# Patient Record
Sex: MALE | Race: WHITE | NOT HISPANIC OR LATINO | Employment: OTHER | URBAN - METROPOLITAN AREA
[De-identification: names, ages, dates, MRNs, and addresses within clinical notes are randomized per-mention and may not be internally consistent; named-entity substitution may affect disease eponyms.]

---

## 2018-01-09 ENCOUNTER — OFFICE VISIT (OUTPATIENT)
Dept: INTERNAL MEDICINE | Facility: CLINIC | Age: 73
End: 2018-01-09
Payer: MEDICARE

## 2018-01-09 VITALS
WEIGHT: 199.75 LBS | HEIGHT: 69 IN | HEART RATE: 70 BPM | SYSTOLIC BLOOD PRESSURE: 124 MMHG | BODY MASS INDEX: 29.59 KG/M2 | DIASTOLIC BLOOD PRESSURE: 72 MMHG | TEMPERATURE: 97 F | OXYGEN SATURATION: 98 %

## 2018-01-09 DIAGNOSIS — M54.16 LUMBAR RADICULOPATHY: ICD-10-CM

## 2018-01-09 DIAGNOSIS — J01.40 ACUTE NON-RECURRENT PANSINUSITIS: ICD-10-CM

## 2018-01-09 DIAGNOSIS — Z95.820 S/P ANGIOPLASTY WITH STENT: ICD-10-CM

## 2018-01-09 DIAGNOSIS — H66.001 ACUTE SUPPURATIVE OTITIS MEDIA OF RIGHT EAR WITHOUT SPONTANEOUS RUPTURE OF TYMPANIC MEMBRANE, RECURRENCE NOT SPECIFIED: Primary | ICD-10-CM

## 2018-01-09 DIAGNOSIS — E78.00 PURE HYPERCHOLESTEROLEMIA: ICD-10-CM

## 2018-01-09 DIAGNOSIS — K58.0 IRRITABLE BOWEL SYNDROME WITH DIARRHEA: ICD-10-CM

## 2018-01-09 DIAGNOSIS — I10 ESSENTIAL HYPERTENSION: ICD-10-CM

## 2018-01-09 DIAGNOSIS — I25.10 CORONARY ARTERY DISEASE, ANGINA PRESENCE UNSPECIFIED, UNSPECIFIED VESSEL OR LESION TYPE, UNSPECIFIED WHETHER NATIVE OR TRANSPLANTED HEART: ICD-10-CM

## 2018-01-09 PROBLEM — K58.8 OTHER IRRITABLE BOWEL SYNDROME: Status: ACTIVE | Noted: 2018-01-09

## 2018-01-09 PROBLEM — G47.09 OTHER INSOMNIA: Status: ACTIVE | Noted: 2018-01-09

## 2018-01-09 PROBLEM — E78.49 OTHER HYPERLIPIDEMIA: Status: ACTIVE | Noted: 2018-01-09

## 2018-01-09 PROCEDURE — 1159F MED LIST DOCD IN RCRD: CPT | Mod: S$GLB,,, | Performed by: FAMILY MEDICINE

## 2018-01-09 PROCEDURE — 99999 PR PBB SHADOW E&M-EST. PATIENT-LVL III: CPT | Mod: PBBFAC,,, | Performed by: FAMILY MEDICINE

## 2018-01-09 PROCEDURE — 99205 OFFICE O/P NEW HI 60 MIN: CPT | Mod: S$GLB,,, | Performed by: FAMILY MEDICINE

## 2018-01-09 PROCEDURE — 1125F AMNT PAIN NOTED PAIN PRSNT: CPT | Mod: S$GLB,,, | Performed by: FAMILY MEDICINE

## 2018-01-09 PROCEDURE — 3008F BODY MASS INDEX DOCD: CPT | Mod: S$GLB,,, | Performed by: FAMILY MEDICINE

## 2018-01-09 RX ORDER — GUAIFENESIN AND DEXTROMETHORPHAN HYDROBROMIDE 1200; 60 MG/1; MG/1
1 TABLET, EXTENDED RELEASE ORAL 2 TIMES DAILY
Qty: 20 TABLET | Refills: 0 | Status: SHIPPED | OUTPATIENT
Start: 2018-01-09 | End: 2018-01-19

## 2018-01-09 RX ORDER — ASPIRIN 81 MG/1
81 TABLET ORAL DAILY
COMMUNITY

## 2018-01-09 RX ORDER — NEOMYCIN SULFATE, POLYMYXIN B SULFATE AND HYDROCORTISONE 10; 3.5; 1 MG/ML; MG/ML; [USP'U]/ML
3 SUSPENSION/ DROPS AURICULAR (OTIC) 4 TIMES DAILY
Qty: 10 ML | Refills: 0 | Status: SHIPPED | OUTPATIENT
Start: 2018-01-09 | End: 2018-01-19

## 2018-01-09 RX ORDER — HYDROXYZINE HYDROCHLORIDE 25 MG/1
25 TABLET, FILM COATED ORAL NIGHTLY
Refills: 0 | COMMUNITY
Start: 2017-12-14 | End: 2018-01-09

## 2018-01-09 RX ORDER — DICYCLOMINE HYDROCHLORIDE 20 MG/1
20 TABLET ORAL 2 TIMES DAILY
COMMUNITY

## 2018-01-09 RX ORDER — AMOXICILLIN AND CLAVULANATE POTASSIUM 875; 125 MG/1; MG/1
1 TABLET, FILM COATED ORAL 2 TIMES DAILY
Qty: 20 TABLET | Refills: 0 | Status: SHIPPED | OUTPATIENT
Start: 2018-01-09 | End: 2018-01-19

## 2018-01-09 RX ORDER — HYDRALAZINE HYDROCHLORIDE 50 MG/1
50 TABLET, FILM COATED ORAL 3 TIMES DAILY
Qty: 90 TABLET | Refills: 0
Start: 2018-01-09 | End: 2018-02-09

## 2018-01-09 RX ORDER — FLUTICASONE PROPIONATE 50 MCG
2 SPRAY, SUSPENSION (ML) NASAL DAILY
Qty: 1 BOTTLE | Refills: 0 | Status: SHIPPED | OUTPATIENT
Start: 2018-01-09

## 2018-01-09 NOTE — PROGRESS NOTES
Subjective:   Patient ID:  Stephen Wynn is a 73 y.o. male.    Chief Complaint:  Establish Care and Otalgia    Past Medical History:   Diagnosis Date    CAD (coronary artery disease) 1/9/2018    Essential hypertension 1/9/2018    High cholesterol     Irritable bowel syndrome with diarrhea 1/9/2018    Lumbar radiculopathy 1/9/2018    S/P angioplasty with stent 1/9/2018     Past Surgical History:   Procedure Laterality Date    CORONARY ANGIOPLASTY WITH STENT PLACEMENT      SAMPSON       Family History   Problem Relation Age of Onset    Cancer Mother     Cancer Father     Cancer Sister      Review of patient's allergies indicates:   Allergen Reactions    Hydrocodone Hallucinations       Current Outpatient Prescriptions:     amlodipine (NORVASC) 10 MG tablet, Take 10 mg by mouth once daily., Disp: , Rfl:     aspirin (ECOTRIN) 81 MG EC tablet, Take 81 mg by mouth once daily., Disp: , Rfl:     atorvastatin (LIPITOR) 40 MG tablet, Take 40 mg by mouth once daily., Disp: , Rfl:     dicyclomine (BENTYL) 20 mg tablet, Take 20 mg by mouth every 6 (six) hours., Disp: , Rfl:     metoprolol succinate (TOPROL-XL) 50 MG 24 hr tablet, Take 50 mg by mouth once daily., Disp: , Rfl:     polycarbophil (FIBERCON) 625 mg tablet, Take 625 mg by mouth once daily., Disp: , Rfl:     fluticasone (FLONASE) 50 mcg/actuation nasal spray, 2 sprays (100 mcg total) by Each Nare route once daily., Disp: 1 Bottle, Rfl: 0    hydrALAZINE (APRESOLINE) 50 MG tablet, Take 1 tablet (50 mg total) by mouth 3 (three) times daily., Disp: 90 tablet, Rfl: 0       Presents to establish care and evaluation of right ear pain.  Hypertension.  Toprol-XL 50 mg daily, amlodipine 10 mg daily, hydralazine 50 mg 3 times a day.  Reports controlled.  Hyperlipidemia with known cardiac artery disease status post that.  On aspirin 81 mg daily Lipitor 40 mg daily.  Cardiologist Dr. Olguin  Irritable bowel syndrome stable with Bentyl and FiberCon.  Chronic  lumbar radiculopathy.  Spine specialist Dr Wall   No old records available today.      Otalgia    There is pain in the right ear. This is a new problem. The current episode started in the past 7 days. The problem occurs constantly. The problem has been gradually worsening. There has been no fever. The pain is moderate. Associated symptoms include abdominal pain, coughing, ear discharge, headaches and rhinorrhea. Pertinent negatives include no diarrhea, hearing loss, neck pain, rash, sore throat or vomiting. He has tried nothing for the symptoms. There is no history of a chronic ear infection, hearing loss or a tympanostomy tube.   Hypertension   This is a chronic problem. The current episode started more than 1 year ago. The problem is controlled. Associated symptoms include headaches. Pertinent negatives include no anxiety, blurred vision, chest pain, malaise/fatigue, neck pain, orthopnea, palpitations, peripheral edema, PND, shortness of breath or sweats. There are no associated agents to hypertension. Risk factors: Known CAD with Stent. Past treatments include calcium channel blockers, beta blockers and direct vasodilators. The current treatment provides significant improvement. There are no compliance problems.  Hypertensive end-organ damage includes CAD/MI. There is no history of angina, kidney disease, CVA, heart failure, left ventricular hypertrophy, PVD or retinopathy.     Review of Systems   Constitutional: Negative for chills, fatigue, fever and malaise/fatigue.   HENT: Positive for ear discharge, ear pain and rhinorrhea. Negative for congestion, hearing loss, postnasal drip, sinus pressure, sneezing, sore throat, trouble swallowing and voice change.    Eyes: Negative for blurred vision and visual disturbance.   Respiratory: Positive for cough. Negative for shortness of breath and wheezing.    Cardiovascular: Negative for chest pain, palpitations, orthopnea, leg swelling and PND.   Gastrointestinal:  "Positive for abdominal pain. Negative for blood in stool, constipation, diarrhea, nausea and vomiting.   Genitourinary: Negative for decreased urine volume, difficulty urinating, dysuria, flank pain, frequency, hematuria and urgency.   Musculoskeletal: Positive for back pain and myalgias. Negative for arthralgias, gait problem, joint swelling, neck pain and neck stiffness.   Skin: Negative for rash.   Neurological: Positive for headaches. Negative for dizziness, tremors, syncope, weakness, light-headedness and numbness.   Psychiatric/Behavioral: Negative for sleep disturbance. The patient is not nervous/anxious.        Objective:   /72 (BP Location: Right arm, Patient Position: Sitting, BP Method: Large (Manual))   Pulse 70   Temp 96.8 °F (36 °C) (Tympanic)   Ht 5' 8.5" (1.74 m)   Wt 90.6 kg (199 lb 11.8 oz)   SpO2 98%   BMI 29.93 kg/m²     Physical Exam   Constitutional: Vital signs are normal. He appears well-developed and well-nourished.  Non-toxic appearance. He does not have a sickly appearance. He does not appear ill. No distress.   HENT:   Right Ear: No lacerations. There is drainage, swelling and tenderness. No foreign bodies. No mastoid tenderness. Tympanic membrane is injected, scarred, erythematous and bulging. Tympanic membrane is not perforated and not retracted. Tympanic membrane mobility is abnormal. No middle ear effusion. No hemotympanum. Decreased hearing is noted.   Left Ear: Hearing, tympanic membrane, external ear and ear canal normal.   Nose: Mucosal edema and rhinorrhea present. Right sinus exhibits maxillary sinus tenderness and frontal sinus tenderness. Left sinus exhibits maxillary sinus tenderness and frontal sinus tenderness.   Mouth/Throat: Uvula is midline, oropharynx is clear and moist and mucous membranes are normal. No tonsillar exudate.   Eyes: Conjunctivae are normal. Right eye exhibits no discharge. Left eye exhibits no discharge. Right conjunctiva is not injected. " Left conjunctiva is not injected. No scleral icterus.   Neck: Normal range of motion and full passive range of motion without pain. Neck supple. No JVD present.   Cardiovascular: Normal rate, regular rhythm and normal heart sounds.  Exam reveals no gallop and no friction rub.    No murmur heard.  Pulmonary/Chest: Effort normal and breath sounds normal. No respiratory distress. He has no decreased breath sounds. He has no wheezes. He has no rhonchi. He has no rales.   Abdominal: Soft. Normal appearance. He exhibits no distension. Bowel sounds are increased. There is no hepatosplenomegaly. There is no tenderness. There is no rebound, no guarding and no CVA tenderness.   Musculoskeletal: He exhibits no edema.        Lumbar back: He exhibits decreased range of motion, bony tenderness and pain. He exhibits no tenderness, no swelling, no edema, no deformity, no laceration and no spasm.   Lymphadenopathy:     He has no cervical adenopathy.        Right: No inguinal adenopathy present.        Left: No inguinal adenopathy present.   Neurological: He has normal reflexes. He displays a negative Romberg sign. Coordination and gait normal.   Skin: Skin is warm, dry and intact. Capillary refill takes less than 2 seconds. No rash noted.   Psychiatric: He has a normal mood and affect.   Nursing note and vitals reviewed.    Assessment:     1. Acute suppurative otitis media of right ear without spontaneous rupture of tympanic membrane, recurrence not specified    2. Acute non-recurrent pansinusitis    3. Essential hypertension    4. Pure hypercholesterolemia    5. Coronary artery disease, angina presence unspecified, unspecified vessel or lesion type, unspecified whether native or transplanted heart    6. S/P angioplasty with stent    7. Irritable bowel syndrome with diarrhea    8. Lumbar radiculopathy      Plan:   Acute suppurative otitis media of right ear without spontaneous rupture of tympanic membrane, recurrence not  specified  Acute non-recurrent pansinusitis  -     amoxicillin-clavulanate 875-125mg (AUGMENTIN) 875-125 mg per tablet; Take 1 tablet by mouth 2 (two) times daily.  Dispense: 20 tablet; Refill: 0  -     neomycin-polymyxin-hydrocortisone (CORTISPORIN) 3.5-10,000-1 mg/mL-unit/mL-% otic suspension; Place 3 drops into the right ear 4 (four) times daily.  Dispense: 10 mL; Refill: 0  -     dextromethorphan-guaifenesin (MUCINEX DM) 60-1,200 mg per 12 hr tablet; Take 1 tablet by mouth 2 (two) times daily.  Dispense: 20 tablet; Refill: 0        -     fluticasone (FLONASE) 50 mcg/actuation nasal spray; 2 sprays (100 mcg total) by Each Nare route once daily.  Dispense: 1 Bottle; Refill: 0  Increase Fluids   Rest  Head of Bed 45 degrees  Warm Compresses Over Sinuses Twice a Day  Tylenol or Motrin for fever or aches  Nothing into ear canal except drops as prescribed    Essential hypertension  -     hydrALAZINE (APRESOLINE) 50 MG tablet; Take 1 tablet (50 mg total) by mouth 3 (three) times daily.  Dispense: 90 tablet; Refill: 0  Controlled.  BP at goal.  Continue present medications.      Pure hypercholesterolemia  Coronary artery disease, angina presence unspecified, unspecified vessel or lesion type, unspecified whether native or transplanted heart  S/P angioplasty with stent   stable.  Asymptomatic.  Continue present medications.  Follow-up cardiology as planned.      Irritable bowel syndrome with diarrhea  Continue Bentyl/FiberCon as needed.    Lumbar radiculopathy  Follow-up Dr. Wall as scheduled    Request old records  Return to clinic 1 month.  Update health maintenance is needed then.

## 2018-02-09 ENCOUNTER — OFFICE VISIT (OUTPATIENT)
Dept: INTERNAL MEDICINE | Facility: CLINIC | Age: 73
End: 2018-02-09
Payer: MEDICARE

## 2018-02-09 VITALS
RESPIRATION RATE: 16 BRPM | OXYGEN SATURATION: 96 % | SYSTOLIC BLOOD PRESSURE: 100 MMHG | HEART RATE: 48 BPM | TEMPERATURE: 98 F | WEIGHT: 199.63 LBS | HEIGHT: 69 IN | DIASTOLIC BLOOD PRESSURE: 60 MMHG | BODY MASS INDEX: 29.57 KG/M2

## 2018-02-09 DIAGNOSIS — Z00.00 WELL ADULT ON ROUTINE HEALTH CHECK: Primary | ICD-10-CM

## 2018-02-09 DIAGNOSIS — E78.00 PURE HYPERCHOLESTEROLEMIA: ICD-10-CM

## 2018-02-09 DIAGNOSIS — M54.16 LUMBAR RADICULOPATHY: ICD-10-CM

## 2018-02-09 PROCEDURE — 90471 IMMUNIZATION ADMIN: CPT | Mod: S$GLB,,, | Performed by: PEDIATRICS

## 2018-02-09 PROCEDURE — 99999 PR PBB SHADOW E&M-EST. PATIENT-LVL IV: CPT | Mod: PBBFAC,,, | Performed by: PHYSICIAN ASSISTANT

## 2018-02-09 PROCEDURE — 1126F AMNT PAIN NOTED NONE PRSNT: CPT | Mod: S$GLB,,, | Performed by: PHYSICIAN ASSISTANT

## 2018-02-09 PROCEDURE — 3008F BODY MASS INDEX DOCD: CPT | Mod: S$GLB,,, | Performed by: PHYSICIAN ASSISTANT

## 2018-02-09 PROCEDURE — 90715 TDAP VACCINE 7 YRS/> IM: CPT | Mod: S$GLB,,, | Performed by: PEDIATRICS

## 2018-02-09 PROCEDURE — 99213 OFFICE O/P EST LOW 20 MIN: CPT | Mod: 25,S$GLB,, | Performed by: PHYSICIAN ASSISTANT

## 2018-02-09 PROCEDURE — 1159F MED LIST DOCD IN RCRD: CPT | Mod: S$GLB,,, | Performed by: PHYSICIAN ASSISTANT

## 2018-02-09 RX ORDER — MELOXICAM 7.5 MG/1
7.5 TABLET ORAL DAILY
Qty: 21 TABLET | Refills: 2 | Status: SHIPPED | OUTPATIENT
Start: 2018-02-09 | End: 2018-04-29 | Stop reason: SDUPTHER

## 2018-02-09 RX ORDER — NEOMYCIN SULFATE, POLYMYXIN B SULFATE AND DEXAMETHASONE 3.5; 10000; 1 MG/ML; [USP'U]/ML; MG/ML
SUSPENSION/ DROPS OPHTHALMIC 3 TIMES DAILY
Refills: 0 | COMMUNITY
Start: 2018-01-24 | End: 2018-03-06 | Stop reason: ALTCHOICE

## 2018-02-09 RX ORDER — HYDRALAZINE HYDROCHLORIDE 50 MG/1
50 TABLET, FILM COATED ORAL DAILY
COMMUNITY

## 2018-02-09 NOTE — PROGRESS NOTES
Subjective:       Patient ID: Stephen Wynn is a 73 y.o.W/ male.    Chief Complaint: Follow-up    HPI         He comes in by himself today and has the above need.  He was just seen one month ago by his PCP Dr. Holly for an otitis media.  He was asked to come back in a month so they can clean up his health maintenance needs.  The patient and I went over what he needed updated.  We are going to do a lipid profile on him today and also give him a tetanus shot Adacel.  He did have a colonoscopy done at age 69 by [facility] and is never had any diverticulosis or colon polyps.  He also needs to get his Pneumovax and his zoster shots and I asked him to get those at his local pharmacy.  He did have a flu vaccine given to him October 2017.        He has no problems today.  His ears and his hearing are normal.  He doesn't have any nasal or chest congestion.  He hasn't had any fever or chills.    Review of Systems    Otherwise negative concerning the RESPIRATORY, PULMONARY, CV, VASCULAR, RENAL, GI, , INTEGUMENT, and NEUROLOGIC system review.    Objective:      Physical Exam    ENT: All within normal limits.  CHEST: Clear BS anterior to posterior.  HEART: RSR.  S1 is greater than S2.  Pulse rate is 50 bpm and regular.  He has no peripheral edema.    Assessment:       1. Well adult on routine health check    2. Lumbar radiculopathy    3. Pure hypercholesterolemia        Plan:     1.  FLP's were ordered today.  He will also have a Adacel injection.  2.  He will get his Pneumovax shot and also his Zostavax shot at his local pharmacy.  3.  Follow-up with Dr. Holly about 3 months.

## 2018-03-06 ENCOUNTER — OFFICE VISIT (OUTPATIENT)
Dept: INTERNAL MEDICINE | Facility: CLINIC | Age: 73
End: 2018-03-06
Payer: MEDICARE

## 2018-03-06 VITALS
TEMPERATURE: 98 F | BODY MASS INDEX: 29.65 KG/M2 | SYSTOLIC BLOOD PRESSURE: 108 MMHG | RESPIRATION RATE: 18 BRPM | OXYGEN SATURATION: 95 % | HEIGHT: 69 IN | WEIGHT: 200.19 LBS | HEART RATE: 59 BPM | DIASTOLIC BLOOD PRESSURE: 78 MMHG

## 2018-03-06 DIAGNOSIS — R05.3 PERSISTENT COUGH FOR 3 WEEKS OR LONGER: Primary | ICD-10-CM

## 2018-03-06 PROCEDURE — 99999 PR PBB SHADOW E&M-EST. PATIENT-LVL III: CPT | Mod: PBBFAC,,, | Performed by: PHYSICIAN ASSISTANT

## 2018-03-06 PROCEDURE — 3078F DIAST BP <80 MM HG: CPT | Mod: S$GLB,,, | Performed by: PHYSICIAN ASSISTANT

## 2018-03-06 PROCEDURE — 3074F SYST BP LT 130 MM HG: CPT | Mod: S$GLB,,, | Performed by: PHYSICIAN ASSISTANT

## 2018-03-06 PROCEDURE — 99213 OFFICE O/P EST LOW 20 MIN: CPT | Mod: S$GLB,,, | Performed by: PHYSICIAN ASSISTANT

## 2018-03-06 RX ORDER — AZITHROMYCIN 500 MG/1
500 TABLET, FILM COATED ORAL DAILY
Qty: 10 TABLET | Refills: 0 | Status: SHIPPED | OUTPATIENT
Start: 2018-03-06 | End: 2018-03-16

## 2018-03-06 NOTE — PROGRESS NOTES
Subjective:       Patient ID: Stephen Wynn is a 73 y.o.W/ male.    Chief Complaint: Cough; Nasal Congestion; and Generalized Body Aches    HPI         He comes in by himself today and has the above need.  He started coughing about 2+ weeks ago and just can't seem to get it controlled.  He describes it as a deep cough that really has no suppression ability and catches him off guard several times with the cough.  He's also had some facial stuffiness but there is no drainage from the nose and he's unable to blow anything from the nose.  He's been taking Mucinex night and day products but they don't seem to be helping his cough at all.  He denies any trouble with dyspnea of any modality, sweats, fever, chills, rigors, night sweats, diaphoresis, CP, pleurisy, anorexia, nausea, vomiting etc.        There is nobody ill at home.  He's retired and really doesn't get out in the public much so there is no way he picked it up that he can think of from contacts.    Review of Systems    Otherwise negative concerning the ENT, RESPIRATORY, PULMONARY, and GI system review.    Objective:      Physical Exam    He does have a deep boisterous cough here in the office that sounds loose.  ENT: All within normal limits.  He really does not have a nasal tone to his voice and there is no hoarseness.  He doesn't have any tender glands or adenopathy present in the neck.  There is no turbinate edema or redness inside his nose.  I don't see any rhinorrhea or mucopurulence present.  There is no evidence of PND.  CHEST: Clear BS anterior to posterior.  There is no wheeze, rhonchi, or rales.  He does not appear to be in any respiratory distress.    Assessment:       1. Persistent cough for 3 weeks or longer        Plan:     1.  He can continue using his Flonase but I gave him the proper instruction on how to use this particular medicine.  Also use antihistamine of choice and Mucinex DM 1200 mg twice a day to help loosen and promote drainage  from his chest.  Make sure he is getting adequate amounts of liquid with at least 30 ounces per day  2.  Start him on Zithromax 500 mg daily with food ×10 days.  Recheck at the end of that time if the cough persists.

## 2018-04-29 DIAGNOSIS — M54.16 LUMBAR RADICULOPATHY: ICD-10-CM

## 2018-04-30 RX ORDER — MELOXICAM 7.5 MG/1
7.5 TABLET ORAL DAILY
Qty: 30 TABLET | Refills: 0 | Status: SHIPPED | OUTPATIENT
Start: 2018-04-30

## 2018-04-30 NOTE — TELEPHONE ENCOUNTER
Approved but patient needs a follow up appointment for any additional refills.  To safely continue medication I must perform some lab work.

## 2019-02-02 ENCOUNTER — HOSPITAL ENCOUNTER (EMERGENCY)
Facility: HOSPITAL | Age: 74
Discharge: ANOTHER HOSPITAL | End: 2019-02-03
Attending: FAMILY MEDICINE
Payer: MEDICARE

## 2019-02-02 DIAGNOSIS — R60.9 SWELLING: ICD-10-CM

## 2019-02-02 DIAGNOSIS — T81.40XA POSTOPERATIVE INFECTION, UNSPECIFIED TYPE, INITIAL ENCOUNTER: Primary | ICD-10-CM

## 2019-02-02 LAB
ALBUMIN SERPL BCP-MCNC: 3.5 G/DL
ALP SERPL-CCNC: 108 U/L
ALT SERPL W/O P-5'-P-CCNC: 28 U/L
ANION GAP SERPL CALC-SCNC: 12 MMOL/L
APTT BLDCRRT: 29.8 SEC
AST SERPL-CCNC: 19 U/L
BASOPHILS # BLD AUTO: 0.02 K/UL
BASOPHILS NFR BLD: 0.1 %
BILIRUB SERPL-MCNC: 1.7 MG/DL
BUN SERPL-MCNC: 12 MG/DL
CALCIUM SERPL-MCNC: 9.3 MG/DL
CHLORIDE SERPL-SCNC: 107 MMOL/L
CO2 SERPL-SCNC: 19 MMOL/L
CREAT SERPL-MCNC: 1 MG/DL
DIFFERENTIAL METHOD: ABNORMAL
EOSINOPHIL # BLD AUTO: 0 K/UL
EOSINOPHIL NFR BLD: 0.1 %
ERYTHROCYTE [DISTWIDTH] IN BLOOD BY AUTOMATED COUNT: 13.2 %
EST. GFR  (AFRICAN AMERICAN): >60 ML/MIN/1.73 M^2
EST. GFR  (NON AFRICAN AMERICAN): >60 ML/MIN/1.73 M^2
GLUCOSE SERPL-MCNC: 123 MG/DL
HCT VFR BLD AUTO: 43.9 %
HGB BLD-MCNC: 15.5 G/DL
INR PPP: 1.1
LACTATE SERPL-SCNC: 1.7 MMOL/L
LYMPHOCYTES # BLD AUTO: 1.5 K/UL
LYMPHOCYTES NFR BLD: 6.7 %
MCH RBC QN AUTO: 31.1 PG
MCHC RBC AUTO-ENTMCNC: 35.3 G/DL
MCV RBC AUTO: 88 FL
MONOCYTES # BLD AUTO: 2 K/UL
MONOCYTES NFR BLD: 8.5 %
NEUTROPHILS # BLD AUTO: 19.4 K/UL
NEUTROPHILS NFR BLD: 84.9 %
PLATELET # BLD AUTO: 202 K/UL
PMV BLD AUTO: 10.2 FL
POTASSIUM SERPL-SCNC: 3.2 MMOL/L
PROCALCITONIN SERPL IA-MCNC: 0.19 NG/ML
PROT SERPL-MCNC: 6.7 G/DL
PROTHROMBIN TIME: 11.6 SEC
RBC # BLD AUTO: 4.99 M/UL
SODIUM SERPL-SCNC: 138 MMOL/L
WBC # BLD AUTO: 22.93 K/UL

## 2019-02-02 PROCEDURE — 25000003 PHARM REV CODE 250: Performed by: FAMILY MEDICINE

## 2019-02-02 PROCEDURE — 87040 BLOOD CULTURE FOR BACTERIA: CPT

## 2019-02-02 PROCEDURE — 85025 COMPLETE CBC W/AUTO DIFF WBC: CPT

## 2019-02-02 PROCEDURE — 96366 THER/PROPH/DIAG IV INF ADDON: CPT

## 2019-02-02 PROCEDURE — 80053 COMPREHEN METABOLIC PANEL: CPT

## 2019-02-02 PROCEDURE — 99285 EMERGENCY DEPT VISIT HI MDM: CPT | Mod: 25

## 2019-02-02 PROCEDURE — 85730 THROMBOPLASTIN TIME PARTIAL: CPT

## 2019-02-02 PROCEDURE — 83605 ASSAY OF LACTIC ACID: CPT

## 2019-02-02 PROCEDURE — 63600175 PHARM REV CODE 636 W HCPCS: Performed by: FAMILY MEDICINE

## 2019-02-02 PROCEDURE — 96375 TX/PRO/DX INJ NEW DRUG ADDON: CPT

## 2019-02-02 PROCEDURE — 96365 THER/PROPH/DIAG IV INF INIT: CPT

## 2019-02-02 PROCEDURE — 84145 PROCALCITONIN (PCT): CPT

## 2019-02-02 PROCEDURE — 85610 PROTHROMBIN TIME: CPT

## 2019-02-02 RX ORDER — ONDANSETRON 2 MG/ML
4 INJECTION INTRAMUSCULAR; INTRAVENOUS
Status: COMPLETED | OUTPATIENT
Start: 2019-02-02 | End: 2019-02-02

## 2019-02-02 RX ORDER — MORPHINE SULFATE 10 MG/ML
4 INJECTION INTRAMUSCULAR; INTRAVENOUS; SUBCUTANEOUS
Status: COMPLETED | OUTPATIENT
Start: 2019-02-02 | End: 2019-02-02

## 2019-02-02 RX ORDER — VANCOMYCIN HCL IN 5 % DEXTROSE 1G/250ML
1000 PLASTIC BAG, INJECTION (ML) INTRAVENOUS
Status: COMPLETED | OUTPATIENT
Start: 2019-02-02 | End: 2019-02-03

## 2019-02-02 RX ADMIN — MORPHINE SULFATE 4 MG: 10 INJECTION INTRAVENOUS at 09:02

## 2019-02-02 RX ADMIN — VANCOMYCIN HYDROCHLORIDE 1000 MG: 1 INJECTION, POWDER, LYOPHILIZED, FOR SOLUTION INTRAVENOUS at 11:02

## 2019-02-02 RX ADMIN — ONDANSETRON 4 MG: 2 INJECTION INTRAMUSCULAR; INTRAVENOUS at 11:02

## 2019-02-03 VITALS
HEART RATE: 75 BPM | BODY MASS INDEX: 30.59 KG/M2 | HEIGHT: 68 IN | TEMPERATURE: 98 F | WEIGHT: 201.81 LBS | SYSTOLIC BLOOD PRESSURE: 156 MMHG | DIASTOLIC BLOOD PRESSURE: 76 MMHG | OXYGEN SATURATION: 98 % | RESPIRATION RATE: 18 BRPM

## 2019-02-03 NOTE — ED PROVIDER NOTES
SCRIBE #1 NOTE: I, Elenita Hampton, am scribing for, and in the presence of, Fidelina Preston MD. I have scribed the entire note.      History      Chief Complaint   Patient presents with    Post-op Problem     pt reports cyst removed from scrotum 8 days ago and began having scrotal pain radiating into lower abd yesterday; also having chills, denies fever       Review of patient's allergies indicates:   Allergen Reactions    Hydrocodone Hallucinations        HPI   HPI    2/2/2019, 8:32 PM   History obtained from the patient      History of Present Illness: Stephen Wynn is a 74 y.o. male patient who presents to the Emergency Department for post-op problem which onset yesterday. Symptoms are constant and moderate in severity. Pt reports having a scrotal cyst removed 8 days ago [facility]. Pt reports noticing pain and swelling to site today. Pt also c/o bleeding from suture site which onset 1 hour PTA. Pt reports sxs are exacerbated with movement. No other mitigating or exacerbating factors reported. Associated sxs include subjective fever and chills. Patient denies any fever, chills, nausea, emesis, dysuria, hematuria, diarrhea, constipation, flank pain, testicular pain, penile swelling, and all other sxs at this time. No further complaints or concerns at this time.       Arrival mode: Personal vehicle     PCP: Bo Snider MD       Past Medical History:  Past Medical History:   Diagnosis Date    CAD (coronary artery disease) 1/9/2018    Essential hypertension 1/9/2018    High cholesterol     Irritable bowel syndrome with diarrhea 1/9/2018    Lumbar radiculopathy 1/9/2018    S/P angioplasty with stent 1/9/2018       Past Surgical History:  Past Surgical History:   Procedure Laterality Date    CORONARY ANGIOPLASTY WITH STENT PLACEMENT      SAMPSON      LUMBAR EPIDURAL INJECTION  03/2018         Family History:  Family History   Problem Relation Age of Onset    Cancer  Mother         Unknown type    Cancer Father         Unknown type    Hypertension Sister     COPD Sister     COPD Sister        Social History:  Social History     Tobacco Use    Smoking status: Never Smoker    Smokeless tobacco: Never Used   Substance and Sexual Activity    Alcohol use: No    Drug use: No    Sexual activity: Not given       ROS   Review of Systems   Constitutional: Negative for appetite change, chills and fever.   HENT: Negative for sore throat and trouble swallowing.    Respiratory: Negative for cough and shortness of breath.    Cardiovascular: Negative for chest pain.   Gastrointestinal: Negative for abdominal pain, blood in stool, constipation, diarrhea, nausea and vomiting.   Genitourinary: Positive for scrotal swelling. Negative for dysuria, flank pain, hematuria, penile swelling and testicular pain.        (+) Scrotal pain   Musculoskeletal: Negative for back pain.   Skin: Negative for rash and wound.   Neurological: Negative for dizziness, weakness and numbness.   All other systems reviewed and are negative.      Physical Exam      Initial Vitals [02/02/19 2012]   BP Pulse Resp Temp SpO2   (!) 152/72 89 16 97.5 °F (36.4 °C) (!) 94 %      MAP       --          Physical Exam  Nursing Notes and Vital Signs Reviewed.  Constitutional: Patient is in no acute distress. Well-developed and well-nourished.  Head: Atraumatic. Normocephalic.  Eyes: PERRL. EOM intact. Conjunctivae are not pale. No scleral icterus.  ENT: Mucous membranes are moist. Oropharynx is clear and symmetric.    Neck: Supple. Full ROM. No lymphadenopathy.  Cardiovascular: Regular rate. Regular rhythm. No murmurs, rubs, or gallops. Distal pulses are 2+ and symmetric.  Pulmonary/Chest: No respiratory distress. Clear to auscultation bilaterally. No wheezing or rales.  Abdominal: Soft and non-distended.  There is  tenderness.  No rebound, guarding, or rigidity. Good bowel sounds.  :  Penis is circumcised. No penile  "discharge. Normal bilateral testicular lie and position. No masses or hernias around the scrotum, testicles, or inguinal canal. Scrotal swelling to right aspect of scrotum. There is a large area of induration approximately 7x4cm. No fluctuance noted. Suture line is clean and intact. Minimal bleeding from lateral suture. Erythema noted from scrotum to right inguinal region. Limited exam secondary to pain.  Musculoskeletal: Moves all extremities. No obvious deformities. No edema. No calf tenderness.  Skin: Warm and dry.  Neurological:  Alert, awake, and appropriate.  Normal speech.  No acute focal neurological deficits are appreciated.  Psychiatric: Normal affect. Good eye contact. Appropriate in content.    ED Course    Procedures  ED Vital Signs:  Vitals:    02/02/19 2012 02/02/19 2101 02/02/19 2102 02/02/19 2153   BP: (!) 152/72   (!) 149/72   Pulse: 89   78   Resp: 16   18   Temp: 97.5 °F (36.4 °C)   98.8 °F (37.1 °C)   TempSrc: Oral   Oral   SpO2: (!) 94%   96%   Weight:  91.5 kg (201 lb 12.8 oz) 91.5 kg (201 lb 12.8 oz)    Height: 5' 8" (1.727 m)       02/02/19 2318 02/02/19 2320 02/03/19 0220   BP: (!) 147/72 (!) 147/72 (!) 156/76   Pulse: 79  75   Resp: 18  18   Temp: 98.5 °F (36.9 °C)  97.9 °F (36.6 °C)   TempSrc: Oral     SpO2: 98%     Weight:      Height:          Abnormal Lab Results:  Labs Reviewed   CBC W/ AUTO DIFFERENTIAL - Abnormal; Notable for the following components:       Result Value    WBC 22.93 (*)     MCH 31.1 (*)     Gran # (ANC) 19.4 (*)     Mono # 2.0 (*)     Gran% 84.9 (*)     Lymph% 6.7 (*)     All other components within normal limits   COMPREHENSIVE METABOLIC PANEL - Abnormal; Notable for the following components:    Potassium 3.2 (*)     CO2 19 (*)     Glucose 123 (*)     Total Bilirubin 1.7 (*)     All other components within normal limits   CULTURE, BLOOD   CULTURE, BLOOD   APTT   PROTIME-INR   LACTIC ACID, PLASMA   PROCALCITONIN        All Lab Results:  Results for orders placed or " performed during the hospital encounter of 02/02/19   APTT   Result Value Ref Range    aPTT 29.8 21.0 - 32.0 sec   CBC auto differential   Result Value Ref Range    WBC 22.93 (H) 3.90 - 12.70 K/uL    RBC 4.99 4.60 - 6.20 M/uL    Hemoglobin 15.5 14.0 - 18.0 g/dL    Hematocrit 43.9 40.0 - 54.0 %    MCV 88 82 - 98 fL    MCH 31.1 (H) 27.0 - 31.0 pg    MCHC 35.3 32.0 - 36.0 g/dL    RDW 13.2 11.5 - 14.5 %    Platelets 202 150 - 350 K/uL    MPV 10.2 9.2 - 12.9 fL    Gran # (ANC) 19.4 (H) 1.8 - 7.7 K/uL    Lymph # 1.5 1.0 - 4.8 K/uL    Mono # 2.0 (H) 0.3 - 1.0 K/uL    Eos # 0.0 0.0 - 0.5 K/uL    Baso # 0.02 0.00 - 0.20 K/uL    Gran% 84.9 (H) 38.0 - 73.0 %    Lymph% 6.7 (L) 18.0 - 48.0 %    Mono% 8.5 4.0 - 15.0 %    Eosinophil% 0.1 0.0 - 8.0 %    Basophil% 0.1 0.0 - 1.9 %    Differential Method Automated    Comprehensive metabolic panel   Result Value Ref Range    Sodium 138 136 - 145 mmol/L    Potassium 3.2 (L) 3.5 - 5.1 mmol/L    Chloride 107 95 - 110 mmol/L    CO2 19 (L) 23 - 29 mmol/L    Glucose 123 (H) 70 - 110 mg/dL    BUN, Bld 12 8 - 23 mg/dL    Creatinine 1.0 0.5 - 1.4 mg/dL    Calcium 9.3 8.7 - 10.5 mg/dL    Total Protein 6.7 6.0 - 8.4 g/dL    Albumin 3.5 3.5 - 5.2 g/dL    Total Bilirubin 1.7 (H) 0.1 - 1.0 mg/dL    Alkaline Phosphatase 108 55 - 135 U/L    AST 19 10 - 40 U/L    ALT 28 10 - 44 U/L    Anion Gap 12 8 - 16 mmol/L    eGFR if African American >60 >60 mL/min/1.73 m^2    eGFR if non African American >60 >60 mL/min/1.73 m^2   Protime-INR   Result Value Ref Range    Prothrombin Time 11.6 9.0 - 12.5 sec    INR 1.1 0.8 - 1.2   Lactic acid, plasma   Result Value Ref Range    Lactate (Lactic Acid) 1.7 0.5 - 2.2 mmol/L   Procalcitonin   Result Value Ref Range    Procalcitonin 0.19 <0.25 ng/mL         Imaging Results:  Imaging Results          US Scrotum And Testicles (Final result)  Result time 02/02/19 21:20:36    Final result by Abdiaziz Lozada MD (02/02/19 21:20:36)                 Impression:      Large  left varicocele.  Atrophic left testicle.  Small right epididymal cyst.  Otherwise negative.      Electronically signed by: Abdiaziz Lozada MD  Date:    02/02/2019  Time:    21:20             Narrative:    EXAMINATION:  US SCROTUM AND TESTICLES    CLINICAL HISTORY:  Edema, unspecified    COMPARISON:  None    FINDINGS:  There is prominent scrotal wall edema and thickening.    The right testicle is 2.8 x 2.8 x5.3 cm.  Normal blood flow.  The epididymis is 11 x 18 mm with a 5 mm cyst.    The left testicle is atrophic at 1.6 x 2.4 x 2.8 cm.  Normal blood flow.  The epididymis is 10 x 16 mm.  There is evidence of a large left varicocele.    No hydrocele.  No abscess.                                        The Emergency Provider reviewed the vital signs and test results, which are outlined above.    ED Discussion     10:24 PM: Re-evaluated pt. Pt is resting comfortably and is in no acute distress.  D/w pt all pertinent results. Requesting information from family for details of [facility] and need to consult with surgeon. Informed pt of possible transfer. D/w pt any concerns expressed at this time. Answered all questions. Pt expresses understanding at this time.    10:35 PM: Family reports pt had surgery at [facility]. Starting transfer process.    11:04 PM: Pt is actively throwing up at this time. Ordered pt zofran.    11:40 PM: Consult with Dr. Cannon (Emergency Medicine) at [facility] concerning pt. There are no Urology services on call at this time, which the patient requires, offered at [facility] at this time. Dr. Cannon expresses understanding and will accept transfer for further evaluation.  Accepting Facility: [facility]  Accepting Physician: Dr. Cannon    11:47 PM: Re-evaluated pt. Informed pt and family that there are no Urology services on call available at this time. I have discussed test results, shared treatment plan, and the need for transfer with patient and family at bedside.  All historical, clinical, radiographic, and laboratory findings were reviewed with the patient/family in detail. [facility] with IV antibiotics required en route. Patient understands that there could be unforeseen motor vehicle accidents or loss of vital signs that could result in potential death or permanent disability. Pt and family express understanding at this time and agree with all information. All questions answered. Pt and family have no further questions or concerns at this time. Pt is ready for transfer.     ED Course as of Feb 03 0530   Sat Feb 02, 2019 2303 Patient is nauseated at this time, open for Zofran and awaiting news from [facility] for transfer  [ALEC]      ED Course User Index  [ALEC] Fidelina Preston MD         ED Medication(s):  Medications   morphine injection 4 mg (4 mg Intravenous Given 2/2/19 2138)   vancomycin in dextrose 5 % 1 gram/250 mL IVPB 1,000 mg (0 mg Intravenous Stopped 2/3/19 0205)   ondansetron injection 4 mg (4 mg Intravenous Given 2/2/19 2307)       Discharge Medication List as of 2/3/2019  2:22 AM              Medical Decision Making    Medical Decision Making:   Clinical Tests:   Lab Tests: Ordered and Reviewed  Radiological Study: Ordered and Reviewed           Scribe Attestation:   Scribe #1: I performed the above scribed service and the documentation accurately describes the services I performed. I attest to the accuracy of the note.    Attending:   Physician Attestation Statement for Scribe #1: I, Fidelina Preston MD, personally performed the services described in this documentation, as scribed by Elenita Hampton, in my presence, and it is both accurate and complete.          Clinical Impression       ICD-10-CM ICD-9-CM   1. Postoperative infection, unspecified type, initial encounter T81.40XA 998.59   2. Swelling R60.9 782.3       Disposition:   Disposition: Transferred  Condition: Stable         Fidelina Preston MD  02/03/19 8721

## 2019-02-08 LAB
BACTERIA BLD CULT: NORMAL
BACTERIA BLD CULT: NORMAL

## 2022-07-20 ENCOUNTER — HOSPITAL ENCOUNTER (EMERGENCY)
Facility: HOSPITAL | Age: 77
Discharge: HOME | End: 2022-07-20
Attending: EMERGENCY MEDICINE
Payer: MEDICARE

## 2022-07-20 VITALS
SYSTOLIC BLOOD PRESSURE: 169 MMHG | RESPIRATION RATE: 19 BRPM | DIASTOLIC BLOOD PRESSURE: 87 MMHG | OXYGEN SATURATION: 96 % | HEART RATE: 76 BPM | TEMPERATURE: 99 F

## 2022-07-20 DIAGNOSIS — U07.1 COVID-19: ICD-10-CM

## 2022-07-20 DIAGNOSIS — R06.02 SOB (SHORTNESS OF BREATH): ICD-10-CM

## 2022-07-20 DIAGNOSIS — U07.1 COVID-19 VIRUS INFECTION: Primary | ICD-10-CM

## 2022-07-20 LAB
ALBUMIN SERPL BCP-MCNC: 3.2 G/DL (ref 3.5–5.2)
ALP SERPL-CCNC: 125 U/L (ref 55–135)
ALT SERPL W/O P-5'-P-CCNC: 31 U/L (ref 10–44)
ANION GAP SERPL CALC-SCNC: 10 MMOL/L (ref 8–16)
AST SERPL-CCNC: 20 U/L (ref 10–40)
BASOPHILS # BLD AUTO: 0.02 K/UL (ref 0–0.2)
BASOPHILS NFR BLD: 0.3 % (ref 0–1.9)
BILIRUB SERPL-MCNC: 1.3 MG/DL (ref 0.1–1)
BNP SERPL-MCNC: 223 PG/ML (ref 0–99)
BUN SERPL-MCNC: 14 MG/DL (ref 8–23)
CALCIUM SERPL-MCNC: 8.5 MG/DL (ref 8.7–10.5)
CHLORIDE SERPL-SCNC: 109 MMOL/L (ref 95–110)
CK SERPL-CCNC: 67 U/L (ref 20–200)
CO2 SERPL-SCNC: 21 MMOL/L (ref 23–29)
CREAT SERPL-MCNC: 1 MG/DL (ref 0.5–1.4)
CRP SERPL-MCNC: 74.9 MG/L (ref 0–8.2)
DIFFERENTIAL METHOD: ABNORMAL
EOSINOPHIL # BLD AUTO: 0 K/UL (ref 0–0.5)
EOSINOPHIL NFR BLD: 0.5 % (ref 0–8)
ERYTHROCYTE [DISTWIDTH] IN BLOOD BY AUTOMATED COUNT: 12.9 % (ref 11.5–14.5)
EST. GFR  (AFRICAN AMERICAN): >60 ML/MIN/1.73 M^2
EST. GFR  (NON AFRICAN AMERICAN): >60 ML/MIN/1.73 M^2
FERRITIN SERPL-MCNC: 648 NG/ML (ref 20–300)
GLUCOSE SERPL-MCNC: 142 MG/DL (ref 70–110)
HCT VFR BLD AUTO: 44.7 % (ref 40–54)
HGB BLD-MCNC: 14.9 G/DL (ref 14–18)
IMM GRANULOCYTES # BLD AUTO: 0.04 K/UL (ref 0–0.04)
IMM GRANULOCYTES NFR BLD AUTO: 0.5 % (ref 0–0.5)
LACTATE SERPL-SCNC: 1.6 MMOL/L (ref 0.5–2.2)
LDH SERPL L TO P-CCNC: 174 U/L (ref 110–260)
LYMPHOCYTES # BLD AUTO: 0.7 K/UL (ref 1–4.8)
LYMPHOCYTES NFR BLD: 8.6 % (ref 18–48)
MCH RBC QN AUTO: 30.8 PG (ref 27–31)
MCHC RBC AUTO-ENTMCNC: 33.3 G/DL (ref 32–36)
MCV RBC AUTO: 92 FL (ref 82–98)
MONOCYTES # BLD AUTO: 0.8 K/UL (ref 0.3–1)
MONOCYTES NFR BLD: 10.3 % (ref 4–15)
NEUTROPHILS # BLD AUTO: 6.3 K/UL (ref 1.8–7.7)
NEUTROPHILS NFR BLD: 79.8 % (ref 38–73)
NRBC BLD-RTO: 0 /100 WBC
PLATELET # BLD AUTO: 150 K/UL (ref 150–450)
PMV BLD AUTO: 10.5 FL (ref 9.2–12.9)
POTASSIUM SERPL-SCNC: 3.3 MMOL/L (ref 3.5–5.1)
PROCALCITONIN SERPL IA-MCNC: 0.05 NG/ML
PROT SERPL-MCNC: 6.5 G/DL (ref 6–8.4)
RBC # BLD AUTO: 4.84 M/UL (ref 4.6–6.2)
SODIUM SERPL-SCNC: 140 MMOL/L (ref 136–145)
TROPONIN I SERPL DL<=0.01 NG/ML-MCNC: 0.01 NG/ML (ref 0–0.03)
WBC # BLD AUTO: 7.9 K/UL (ref 3.9–12.7)

## 2022-07-20 PROCEDURE — 93005 ELECTROCARDIOGRAM TRACING: CPT

## 2022-07-20 PROCEDURE — 83615 LACTATE (LD) (LDH) ENZYME: CPT | Performed by: EMERGENCY MEDICINE

## 2022-07-20 PROCEDURE — 80053 COMPREHEN METABOLIC PANEL: CPT | Performed by: EMERGENCY MEDICINE

## 2022-07-20 PROCEDURE — 85025 COMPLETE CBC W/AUTO DIFF WBC: CPT | Performed by: EMERGENCY MEDICINE

## 2022-07-20 PROCEDURE — 93010 EKG 12-LEAD: ICD-10-PCS | Mod: ,,, | Performed by: INTERNAL MEDICINE

## 2022-07-20 PROCEDURE — 93010 ELECTROCARDIOGRAM REPORT: CPT | Mod: ,,, | Performed by: INTERNAL MEDICINE

## 2022-07-20 PROCEDURE — 63600175 PHARM REV CODE 636 W HCPCS: Performed by: EMERGENCY MEDICINE

## 2022-07-20 PROCEDURE — 82728 ASSAY OF FERRITIN: CPT | Performed by: EMERGENCY MEDICINE

## 2022-07-20 PROCEDURE — 86140 C-REACTIVE PROTEIN: CPT | Performed by: EMERGENCY MEDICINE

## 2022-07-20 PROCEDURE — 84145 PROCALCITONIN (PCT): CPT | Performed by: EMERGENCY MEDICINE

## 2022-07-20 PROCEDURE — 83605 ASSAY OF LACTIC ACID: CPT | Performed by: EMERGENCY MEDICINE

## 2022-07-20 PROCEDURE — 82550 ASSAY OF CK (CPK): CPT | Performed by: EMERGENCY MEDICINE

## 2022-07-20 PROCEDURE — 25000003 PHARM REV CODE 250: Performed by: EMERGENCY MEDICINE

## 2022-07-20 PROCEDURE — 83880 ASSAY OF NATRIURETIC PEPTIDE: CPT | Performed by: EMERGENCY MEDICINE

## 2022-07-20 PROCEDURE — 96374 THER/PROPH/DIAG INJ IV PUSH: CPT

## 2022-07-20 PROCEDURE — 84484 ASSAY OF TROPONIN QUANT: CPT | Performed by: EMERGENCY MEDICINE

## 2022-07-20 PROCEDURE — 99285 EMERGENCY DEPT VISIT HI MDM: CPT | Mod: 25

## 2022-07-20 RX ORDER — PROMETHAZINE HYDROCHLORIDE AND DEXTROMETHORPHAN HYDROBROMIDE 6.25; 15 MG/5ML; MG/5ML
5 SYRUP ORAL EVERY 6 HOURS PRN
Qty: 120 ML | Refills: 0 | Status: SHIPPED | OUTPATIENT
Start: 2022-07-20 | End: 2022-07-30

## 2022-07-20 RX ORDER — PREDNISONE 50 MG/1
50 TABLET ORAL DAILY
Qty: 5 TABLET | Refills: 0 | Status: SHIPPED | OUTPATIENT
Start: 2022-07-20 | End: 2022-07-25

## 2022-07-20 RX ADMIN — PROMETHAZINE HYDROCHLORIDE 12.5 MG: 25 INJECTION INTRAMUSCULAR; INTRAVENOUS at 08:07

## 2022-07-20 NOTE — ED PROVIDER NOTES
SCRIBE #1 NOTE: I, James Gonzalez, am scribing for, and in the presence of, Marshall Abarca MD. I have scribed the entire note.      History      Chief Complaint   Patient presents with    Shortness of Breath      W/ N/V and cough pt tested + for covid 7/19       Review of patient's allergies indicates:   Allergen Reactions    Hydrocodone Hallucinations        HPI   HPI    7/20/2022, 6:11 AM   History obtained from the patient      History of Present Illness: Stephen Wynn is a 77 y.o. male patient who presents to the Emergency Department for SOB, onset several days PTA. Pt states that her tested positive for COVID-19 yesterday. Symptoms are constant and moderate in severity. No mitigating or exacerbating factors reported. Associated sxs include cough and chest pain. Patient denies any fever, chills, n/v/d, weakness, numbness, dizziness, headache, and all other sxs at this time. No prior Tx reported. No further complaints or concerns at this time.     Arrival mode: Personal vehicle    PCP: Bo Snider MD       Past Medical History:  Past Medical History:   Diagnosis Date    CAD (coronary artery disease) 1/9/2018    Essential hypertension 1/9/2018    High cholesterol     Irritable bowel syndrome with diarrhea 1/9/2018    Lumbar radiculopathy 1/9/2018    S/P angioplasty with stent 1/9/2018       Past Surgical History:  Past Surgical History:   Procedure Laterality Date    CORONARY ANGIOPLASTY WITH STENT PLACEMENT      SAMPSON      LUMBAR EPIDURAL INJECTION  03/2018         Family History:  Family History   Problem Relation Age of Onset    Cancer Mother         Unknown type    Cancer Father         Unknown type    Hypertension Sister     COPD Sister     COPD Sister        Social History:  Social History     Tobacco Use    Smoking status: Never Smoker    Smokeless tobacco: Never Used   Substance and Sexual Activity    Alcohol use: No    Drug use: No    Sexual activity: Not on file       ROS    Review of Systems   Constitutional: Negative for chills and fever.   HENT: Negative for sore throat.    Respiratory: Positive for cough and shortness of breath.    Cardiovascular: Positive for chest pain.   Gastrointestinal: Negative for diarrhea, nausea and vomiting.   Genitourinary: Negative for dysuria.   Musculoskeletal: Negative for back pain.   Skin: Negative for rash.   Neurological: Negative for dizziness, facial asymmetry, weakness, light-headedness, numbness and headaches.   Hematological: Does not bruise/bleed easily.   All other systems reviewed and are negative.    Physical Exam      Initial Vitals   BP Pulse Resp Temp SpO2   07/20/22 0553 07/20/22 0553 07/20/22 0553 07/20/22 0757 07/20/22 0553   (!) 160/90 73 18 98.3 °F (36.8 °C) (!) 94 %      MAP       --                 Physical Exam  Nursing Notes and Vital Signs Reviewed.  Constitutional: Patient is in no acute distress. Well-developed and well-nourished.  Head: Atraumatic. Normocephalic.  Eyes: PERRL. EOM intact. Conjunctivae are not pale. No scleral icterus.  ENT: Mucous membranes are moist. Oropharynx is clear and symmetric.    Neck: Supple. Full ROM.   Cardiovascular: Regular rate. Regular rhythm. No murmurs, rubs, or gallops. Distal pulses are 2+ and symmetric.  Pulmonary/Chest: No respiratory distress. Clear to auscultation bilaterally. No wheezing or rales.  Abdominal: Soft and non-distended.  There is no tenderness.  No rebound, guarding, or rigidity.   Musculoskeletal: Moves all extremities. No obvious deformities. No edema.  Skin: Warm and dry.  Neurological:  Alert, awake, and appropriate.  Normal speech.  No acute focal neurological deficits are appreciated.  Psychiatric: Normal affect. Good eye contact. Appropriate in content.    ED Course    Procedures  ED Vital Signs:  Vitals:    07/20/22 0553 07/20/22 0719 07/20/22 0757   BP: (!) 160/90     Pulse: 73     Resp: 18     Temp:   98.3 °F (36.8 °C)   TempSrc:   Oral   SpO2: (!) 94% 96%         Abnormal Lab Results:  Labs Reviewed   CBC W/ AUTO DIFFERENTIAL - Abnormal; Notable for the following components:       Result Value    Lymph # 0.7 (*)     Gran % 79.8 (*)     Lymph % 8.6 (*)     All other components within normal limits   COMPREHENSIVE METABOLIC PANEL - Abnormal; Notable for the following components:    Potassium 3.3 (*)     CO2 21 (*)     Glucose 142 (*)     Calcium 8.5 (*)     Albumin 3.2 (*)     Total Bilirubin 1.3 (*)     All other components within normal limits   C-REACTIVE PROTEIN - Abnormal; Notable for the following components:    CRP 74.9 (*)     All other components within normal limits   FERRITIN - Abnormal; Notable for the following components:    Ferritin 648 (*)     All other components within normal limits   B-TYPE NATRIURETIC PEPTIDE - Abnormal; Notable for the following components:     (*)     All other components within normal limits   LACTATE DEHYDROGENASE   CK   LACTIC ACID, PLASMA   TROPONIN I   PROCALCITONIN        All Lab Results:  Results for orders placed or performed during the hospital encounter of 07/20/22   CBC auto differential   Result Value Ref Range    WBC 7.90 3.90 - 12.70 K/uL    RBC 4.84 4.60 - 6.20 M/uL    Hemoglobin 14.9 14.0 - 18.0 g/dL    Hematocrit 44.7 40.0 - 54.0 %    MCV 92 82 - 98 fL    MCH 30.8 27.0 - 31.0 pg    MCHC 33.3 32.0 - 36.0 g/dL    RDW 12.9 11.5 - 14.5 %    Platelets 150 150 - 450 K/uL    MPV 10.5 9.2 - 12.9 fL    Immature Granulocytes 0.5 0.0 - 0.5 %    Gran # (ANC) 6.3 1.8 - 7.7 K/uL    Immature Grans (Abs) 0.04 0.00 - 0.04 K/uL    Lymph # 0.7 (L) 1.0 - 4.8 K/uL    Mono # 0.8 0.3 - 1.0 K/uL    Eos # 0.0 0.0 - 0.5 K/uL    Baso # 0.02 0.00 - 0.20 K/uL    nRBC 0 0 /100 WBC    Gran % 79.8 (H) 38.0 - 73.0 %    Lymph % 8.6 (L) 18.0 - 48.0 %    Mono % 10.3 4.0 - 15.0 %    Eosinophil % 0.5 0.0 - 8.0 %    Basophil % 0.3 0.0 - 1.9 %    Differential Method Automated    Comprehensive metabolic panel   Result Value Ref Range    Sodium 140  136 - 145 mmol/L    Potassium 3.3 (L) 3.5 - 5.1 mmol/L    Chloride 109 95 - 110 mmol/L    CO2 21 (L) 23 - 29 mmol/L    Glucose 142 (H) 70 - 110 mg/dL    BUN 14 8 - 23 mg/dL    Creatinine 1.0 0.5 - 1.4 mg/dL    Calcium 8.5 (L) 8.7 - 10.5 mg/dL    Total Protein 6.5 6.0 - 8.4 g/dL    Albumin 3.2 (L) 3.5 - 5.2 g/dL    Total Bilirubin 1.3 (H) 0.1 - 1.0 mg/dL    Alkaline Phosphatase 125 55 - 135 U/L    AST 20 10 - 40 U/L    ALT 31 10 - 44 U/L    Anion Gap 10 8 - 16 mmol/L    eGFR if African American >60 >60 mL/min/1.73 m^2    eGFR if non African American >60 >60 mL/min/1.73 m^2   C-Reactive Protein   Result Value Ref Range    CRP 74.9 (H) 0.0 - 8.2 mg/L   Ferritin   Result Value Ref Range    Ferritin 648 (H) 20.0 - 300.0 ng/mL   Lactate Dehydrogenase   Result Value Ref Range     110 - 260 U/L   CK   Result Value Ref Range    CPK 67 20 - 200 U/L   Lactic Acid, Plasma   Result Value Ref Range    Lactate (Lactic Acid) 1.6 0.5 - 2.2 mmol/L   Troponin I   Result Value Ref Range    Troponin I 0.012 0.000 - 0.026 ng/mL   Procalcitonin   Result Value Ref Range    Procalcitonin 0.05 <0.25 ng/mL   Brain Natriuretic Peptide   Result Value Ref Range     (H) 0 - 99 pg/mL     Imaging Results:  Imaging Results          X-Ray Chest AP Portable (Final result)  Result time 07/20/22 07:34:58    Final result by Nilda Adame MD (07/20/22 07:34:58)                 Impression:      No acute cardiopulmonary abnormality.      Electronically signed by: Nilda Adame  Date:    07/20/2022  Time:    07:34             Narrative:    EXAMINATION:  XR CHEST AP PORTABLE    CLINICAL HISTORY:  COVID-19;    TECHNIQUE:  Single frontal view of the chest was performed.    COMPARISON:  CTA chest abdomen pelvis 11/28/2015    FINDINGS:  ECG monitoring leads overlie the chest.The lungs are clear, with normal appearance of pulmonary vasculature and no pleural effusion or pneumothorax, noting the left costophrenic angle is outside the field  of view.    The cardiac silhouette is normal in size given technique.    No acute osseous abnormality.                               The EKG was ordered, reviewed, and independently interpreted by the ED provider.  Interpretation time: 06:22  Rate: 71 BPM  Rhythm: atrial fibrillation  Interpretation: Nonspecific ST abnormality. No STEMI.           The Emergency Provider reviewed the vital signs and test results, which are outlined above.    ED Discussion     8:00 AM: Discussed pt's case with Stefany Nails NP (Hospital Medicine), who discussed with Hospital Medicine Team and states that the pt does not meet admission criteria at this time.    8:44 AM: Reassessed pt at this time. Discussed with pt all pertinent ED information and results. Discussed pt dx and plan of tx. Gave pt all f/u and return to the ED instructions. All questions and concerns were addressed at this time. Pt expresses understanding of information and instructions, and is comfortable with plan to discharge. Pt is stable for discharge.    I discussed with patient and/or family/caretaker that evaluation in the ED does not suggest any emergent or life threatening medical conditions requiring immediate intervention beyond what was provided in the ED, and I believe patient is safe for discharge.  Regardless, an unremarkable evaluation in the ED does not preclude the development or presence of a serious of life threatening condition. As such, patient was instructed to return immediately for any worsening or change in current symptoms.         ED Medication(s):  Medications   promethazine (PHENERGAN) 12.5 mg in dextrose 5 % 50 mL IVPB (12.5 mg Intravenous New Bag 7/20/22 0815)        Follow-up Information     Bo Snider MD.    Specialty: Family Medicine  Contact information:  [address]  [facility] [address] [phone]                        New Prescriptions    PREDNISONE (DELTASONE) 50 MG TAB    Take 1 tablet (50 mg total) by mouth  once daily. for 5 days    PROMETHAZINE-DEXTROMETHORPHAN (PROMETHAZINE-DM) 6.25-15 MG/5 ML SYRP    Take 5 mLs by mouth every 6 (six) hours as needed.         Medical Decision Making    Medical Decision Making:   Clinical Tests:   Lab Tests: Ordered and Reviewed  Radiological Study: Ordered and Reviewed  Medical Tests: Ordered and Reviewed           Scribe Attestation:   Scribe #1: I performed the above scribed service and the documentation accurately describes the services I performed. I attest to the accuracy of the note.    Attending:   Physician Attestation Statement for Scribe #1: I, Marshall Abarca MD, personally performed the services described in this documentation, as scribed by James Gonzalez, in my presence, and it is both accurate and complete.          Clinical Impression       ICD-10-CM ICD-9-CM   1. COVID-19 virus infection  U07.1 079.89   2. COVID-19  U07.1 079.89   3. SOB (shortness of breath)  R06.02 786.05       Disposition:   Disposition: Discharged  Condition: Stable         Marshall Abarca MD  07/20/22 0908

## 2024-09-10 ENCOUNTER — HOSPITAL ENCOUNTER (EMERGENCY)
Facility: HOSPITAL | Age: 79
Discharge: HOME | End: 2024-09-10
Attending: EMERGENCY MEDICINE
Payer: MEDICARE

## 2024-09-10 VITALS
HEIGHT: 67 IN | RESPIRATION RATE: 20 BRPM | DIASTOLIC BLOOD PRESSURE: 91 MMHG | BODY MASS INDEX: 31.71 KG/M2 | TEMPERATURE: 98 F | HEART RATE: 56 BPM | OXYGEN SATURATION: 98 % | WEIGHT: 202 LBS | SYSTOLIC BLOOD PRESSURE: 189 MMHG

## 2024-09-10 DIAGNOSIS — R00.1 CHRONIC SINUS BRADYCARDIA: ICD-10-CM

## 2024-09-10 DIAGNOSIS — H81.10 BENIGN PAROXYSMAL VERTIGO, UNSPECIFIED LATERALITY: ICD-10-CM

## 2024-09-10 DIAGNOSIS — I10 ASYMPTOMATIC HYPERTENSION: ICD-10-CM

## 2024-09-10 DIAGNOSIS — I10 UNCONTROLLED HYPERTENSION: Primary | ICD-10-CM

## 2024-09-10 PROCEDURE — 25000003 PHARM REV CODE 250: Performed by: EMERGENCY MEDICINE

## 2024-09-10 PROCEDURE — 93010 ELECTROCARDIOGRAM REPORT: CPT | Mod: ,,, | Performed by: INTERNAL MEDICINE

## 2024-09-10 PROCEDURE — 93005 ELECTROCARDIOGRAM TRACING: CPT

## 2024-09-10 PROCEDURE — 99284 EMERGENCY DEPT VISIT MOD MDM: CPT | Mod: 25

## 2024-09-10 RX ORDER — MECLIZINE HYDROCHLORIDE 25 MG/1
25 TABLET ORAL
Status: COMPLETED | OUTPATIENT
Start: 2024-09-10 | End: 2024-09-10

## 2024-09-10 RX ADMIN — MECLIZINE HYDROCHLORIDE 25 MG: 25 TABLET ORAL at 03:09

## 2024-09-10 NOTE — ED PROVIDER NOTES
SCRIBE #1 NOTE: I, Ksenia Arvin, am scribing for, and in the presence of, Taylor Poole MD. I have scribed the entire note.       History     Chief Complaint   Patient presents with    Hypertension     Patient presents to ED with c/o increasing blood pressure. States he did take his antihypertensives today, however blood pressure will not go down. Denies headache, denies visual changes or chest pain.     Review of patient's allergies indicates:   Allergen Reactions    Hydrocodone Hallucinations         History of Present Illness     HPI    9/10/2024, 2:57 PM  History obtained from the patient      History of Present Illness: Stephen Wynn is a 79 y.o. male patient with a PMHx of high cholesterol, lumbar radiculopathy, andgioplasty with stent, CAD, IBS, and HTN who presents to the Emergency Department for evaluation of increasing BP which onset gradually PTA. Pt took his BP at 11 AM today and noticed it was increasing. He states he began taking his BP today because he had been feeling dizzy for the last 2 weeks. He notes that before today he had not checked his BP for a couple of weeks. Pt was taken off Metoprolol 2 days ago by cardiology per records reviewed from visit at [facility], due to bradycardia. He also notes he went to an ENT doctor 5-6 days ago and was just given a prescription for his dizziness. Pt is on Meclizine but states he did not take it today. He notes it does not relieve his symptoms. Symptoms are constant and moderate in severity. No mitigating or exacerbating factors reported. Patient denies any headache, vision changes, nausea vomiting, no CP, SOB, and all other sxs at this time. No prior Tx. Pt is on Eliquis. No further complaints or concerns at this time.       Arrival mode: Personal vehicle      PCP: Bo Mcintosh MD        Past Medical History:  Past Medical History:   Diagnosis Date    CAD (coronary artery disease) 1/9/2018    Essential hypertension 1/9/2018    High  cholesterol     Irritable bowel syndrome with diarrhea 1/9/2018    Lumbar radiculopathy 1/9/2018    S/P angioplasty with stent 1/9/2018       Past Surgical History:  Past Surgical History:   Procedure Laterality Date    CORONARY ANGIOPLASTY WITH STENT PLACEMENT      [facility]      LUMBAR EPIDURAL INJECTION  03/2018         Family History:  Family History   Problem Relation Name Age of Onset    Cancer Mother          Unknown type    Cancer Father          Unknown type    Hypertension Sister      COPD Sister      COPD Sister         Social History:  Social History     Tobacco Use    Smoking status: Never    Smokeless tobacco: Never   Substance and Sexual Activity    Alcohol use: No    Drug use: No    Sexual activity: Not on file        Review of Systems     Review of Systems   Constitutional:  Negative for fever.   HENT:  Negative for sore throat.    Respiratory:  Negative for shortness of breath.    Cardiovascular:  Negative for chest pain.        (+) elevated BP   Gastrointestinal:  Negative for nausea.   Genitourinary:  Negative for dysuria.   Musculoskeletal:  Negative for back pain.   Skin:  Negative for rash.   Neurological:  Positive for dizziness. Negative for weakness.   Hematological:  Does not bruise/bleed easily.   All other systems reviewed and are negative.       Physical Exam     Initial Vitals [09/10/24 1402]   BP Pulse Resp Temp SpO2   (S) (!) 203/128 (S) (!) 51 18 97.5 °F (36.4 °C) 96 %      MAP       --          Physical Exam  Nursing Notes and Vital Signs Reviewed.  Constitutional: Patient is in no acute distress. Well-developed and well-nourished.  Head: Atraumatic. Normocephalic.  Eyes: PERRL. EOM intact. Conjunctivae are not pale. No scleral icterus.  ENT: Mucous membranes are moist. Oropharynx is clear and symmetric.    Neck: Supple. Full ROM. No lymphadenopathy.  Cardiovascular: No murmurs, rubs, or gallops. Distal pulses are 2+ and symmetric. Bradycardic.  Pulmonary/Chest: No respiratory distress.  "Clear to auscultation bilaterally. No wheezing or rales.  Abdominal: Soft and non-distended.  There is no tenderness.  No rebound, guarding, or rigidity. Good bowel sounds.  Genitourinary: No CVA tenderness.  Musculoskeletal: Moves all extremities. No obvious deformities. No edema. No calf tenderness.  Skin: Warm and dry.  Neurological:  Alert, awake, and appropriate.  Normal speech.  No acute focal neurological deficits are appreciated.  Psychiatric: Normal affect. Good eye contact. Appropriate in content.     ED Course   Procedures  ED Vital Signs:  Vitals:    09/10/24 1402 09/10/24 1425 09/10/24 1500 09/10/24 1512   BP: (S) (!) 203/128 (!) 182/91 (!) 192/92    Pulse: (S) (!) 51 (!) 53 (!) 52    Resp: 18 19 15    Temp: 97.5 °F (36.4 °C)      TempSrc: Oral      SpO2: 96% 95% 96%    Weight:    91.6 kg (202 lb)   Height: 5' 7" (1.702 m)       09/10/24 1515 09/10/24 1645   BP: (!) 189/91 (!) 189/91   Pulse: (!) 49 (!) 56   Resp: 20    Temp:  97.8 °F (36.6 °C)   TempSrc:     SpO2: 97% 98%   Weight:     Height:         Abnormal Lab Results:  Labs Reviewed - No data to display       All Lab Results:  Results for orders placed or performed during the hospital encounter of 09/10/24   EKG 12-lead   Result Value Ref Range    QRS Duration 90 ms    OHS QTC Calculation 465 ms         Imaging Results:  Imaging Results              CT Head Without Contrast (Final result)  Result time 09/10/24 16:20:27      Final result by Nabor Walden MD (09/10/24 16:20:27)                   Impression:      No acute intracranial finding.    [place] stroke programme early CT score (ASPECTS): 10      Electronically signed by: Nabor Walden  Date:    09/10/2024  Time:    16:20               Narrative:    EXAMINATION:  CT HEAD WITHOUT CONTRAST    CLINICAL HISTORY:  Dizziness, persistent/recurrent, cardiac or vascular cause suspected;    TECHNIQUE:  Low dose axial CT images obtained throughout the head without intravenous contrast. Sagittal and " coronal reconstructions were performed.  The CT exam was performed using one or more of the following dose reduction techniques- Automated exposure control, adjustment of the mA and/or kV according to patient size, and/or use of iterative reconstructed technique.    COMPARISON:  None available.    FINDINGS:  Intracranial compartment:    Ventricles and sulci are unremarkable in size for age without evidence of hydrocephalus. No extra-axial blood or fluid collections.    The brain parenchyma is unremarkable.  No parenchymal mass, hemorrhage, edema or major vascular distribution infarct.    Skull/extracranial contents (limited evaluation): No fracture. Mastoid air cells and paranasal sinuses are essentially clear.                                       The EKG was ordered, reviewed, and independently interpreted by the ED provider.  Interpretation time: 14:14  Rate: 53 BPM  Rhythm: sinus bradycardia  Interpretation: Septal infarct, age undetermined. No STEMI.             The Emergency Provider reviewed the vital signs and test results, which are outlined above.     ED Discussion       4:10 PM: Reassessed pt at this time. Discussed with pt all pertinent ED information and results. Discussed pt dx and plan of tx. Gave pt all f/u and return to the ED instructions. All questions and concerns were addressed at this time. Pt expresses understanding of information and instructions, and is comfortable with plan to discharge. Pt is stable for discharge.    I discussed with patient and/or family/caretaker that evaluation in the ED does not suggest any emergent or life threatening medical conditions requiring immediate intervention beyond what was provided in the ED, and I believe patient is safe for discharge.  Regardless, an unremarkable evaluation in the ED does not preclude the development or presence of a serious of life threatening condition. As such, patient was instructed to return immediately for any worsening or change  in current symptoms.        Medical Decision Making  DDX: 1. Uncontrolled HTN 2. Stroke 3. BPPV    ECG reviewed and sinus daniel noted, no ischemic changes, neuro exam normal, has had dizziness for several weeks which he has been seen by cardiology and ENT, he was just taken off his metoprolol 2 days ago which could explain why his BP is not well controlled however he has not monitored it over past 2 weeks until today.  He otherwise does not appear symptomatic from his BP, he is on amiodarone for atrial fib and chronic anticoagulation, CT head is negative, lab work considered and further workup considered however not indicated clinically, patient advised to continue to monitor BP, low salt diet, if BP remains elevated advised to follow up with his cardiologist.     Amount and/or Complexity of Data Reviewed  External Data Reviewed: labs, ECG and notes.     Details: Cardiology notes from 9/5: Dizziness (Primary)  Assessment & Plan:  -Unsure that this is cardiac in nature however since he is bradycardic we will go ahead and stop his metoprolol.  -Will have him wear 1 week MCOT to rule out any significant arrhythmias  -Updating echo today  -Will also update labs including CMP, magnesium, thyroid function, and CBC.  -Did ask that he follow-up with his primary care provider regarding his dizziness, also consider seeing ENT  -Did send in a prescription for meclizine for him to try as well.    -His blood pressure is slightly elevated however given his history of postural hypotension, will not make any medication adjustments at this time.  Labs: ordered. Decision-making details documented in ED Course.  Radiology: ordered. Decision-making details documented in ED Course.  ECG/medicine tests: ordered and independent interpretation performed. Decision-making details documented in ED Course.                ED Medication(s):  Medications   meclizine tablet 25 mg (25 mg Oral Given 9/10/24 0720)       Discharge Medication List as  of 9/10/2024  4:52 PM           Follow-up Information       Umberto Angulo NP. Schedule an appointment as soon as possible for a visit in 1 day.    Specialty: Cardiology  Contact information:  [address]  [facility] [address]  [phone]                                 Scribe Attestation:   Scribe #1: I performed the above scribed service and the documentation accurately describes the services I performed. I attest to the accuracy of the note.     Attending:   Physician Attestation Statement for Scribe #1: I, Taylor Poole MD, personally performed the services described in this documentation, as scribed by Ksenia Sheridan, in my presence, and it is both accurate and complete.           Clinical Impression       ICD-10-CM ICD-9-CM   1. Uncontrolled hypertension  I10 401.9   2. Benign paroxysmal vertigo, unspecified laterality  H81.10 386.11   3. Asymptomatic hypertension  I10 401.9   4. Chronic sinus bradycardia  R00.1 427.81       Disposition:   Disposition: Discharged  Condition: Stable         Taylor Poole MD  09/10/24 7816

## 2024-09-10 NOTE — DISCHARGE INSTRUCTIONS
You need to keep a log of your blood pressure over the next several days, if it continues to run high please let your cardiologist know so they can adjust your medication.

## 2024-09-11 LAB
OHS QRS DURATION: 90 MS
OHS QTC CALCULATION: 465 MS